# Patient Record
Sex: MALE | Race: WHITE | NOT HISPANIC OR LATINO | URBAN - METROPOLITAN AREA
[De-identification: names, ages, dates, MRNs, and addresses within clinical notes are randomized per-mention and may not be internally consistent; named-entity substitution may affect disease eponyms.]

---

## 2018-01-25 ENCOUNTER — EMERGENCY (EMERGENCY)
Facility: HOSPITAL | Age: 33
LOS: 1 days | Discharge: HOME | End: 2018-01-25
Attending: EMERGENCY MEDICINE | Admitting: EMERGENCY MEDICINE
Payer: MEDICAID

## 2018-01-25 VITALS
HEART RATE: 84 BPM | OXYGEN SATURATION: 100 % | SYSTOLIC BLOOD PRESSURE: 136 MMHG | RESPIRATION RATE: 20 BRPM | DIASTOLIC BLOOD PRESSURE: 80 MMHG | TEMPERATURE: 98 F

## 2018-01-25 LAB
ALBUMIN SERPL ELPH-MCNC: 4.5 G/DL — SIGNIFICANT CHANGE UP (ref 3.3–5)
ALP SERPL-CCNC: 59 U/L — SIGNIFICANT CHANGE UP (ref 40–120)
ALT FLD-CCNC: 65 U/L — HIGH (ref 4–41)
AST SERPL-CCNC: 40 U/L — SIGNIFICANT CHANGE UP (ref 4–40)
BASE EXCESS BLDV CALC-SCNC: 1.4 MMOL/L — SIGNIFICANT CHANGE UP
BASOPHILS # BLD AUTO: 0.04 K/UL — SIGNIFICANT CHANGE UP (ref 0–0.2)
BASOPHILS NFR BLD AUTO: 0.6 % — SIGNIFICANT CHANGE UP (ref 0–2)
BILIRUB SERPL-MCNC: 0.3 MG/DL — SIGNIFICANT CHANGE UP (ref 0.2–1.2)
BLOOD GAS VENOUS - CREATININE: 0.92 MG/DL — SIGNIFICANT CHANGE UP (ref 0.5–1.3)
BUN SERPL-MCNC: 17 MG/DL — SIGNIFICANT CHANGE UP (ref 7–23)
CALCIUM SERPL-MCNC: 9.3 MG/DL — SIGNIFICANT CHANGE UP (ref 8.4–10.5)
CHLORIDE BLDV-SCNC: 106 MMOL/L — SIGNIFICANT CHANGE UP (ref 96–108)
CHLORIDE SERPL-SCNC: 102 MMOL/L — SIGNIFICANT CHANGE UP (ref 98–107)
CO2 SERPL-SCNC: 25 MMOL/L — SIGNIFICANT CHANGE UP (ref 22–31)
CREAT SERPL-MCNC: 0.98 MG/DL — SIGNIFICANT CHANGE UP (ref 0.5–1.3)
EOSINOPHIL # BLD AUTO: 0.4 K/UL — SIGNIFICANT CHANGE UP (ref 0–0.5)
EOSINOPHIL NFR BLD AUTO: 6.3 % — HIGH (ref 0–6)
GAS PNL BLDV: 138 MMOL/L — SIGNIFICANT CHANGE UP (ref 136–146)
GLUCOSE BLDV-MCNC: 90 — SIGNIFICANT CHANGE UP (ref 70–99)
GLUCOSE SERPL-MCNC: 85 MG/DL — SIGNIFICANT CHANGE UP (ref 70–99)
HCO3 BLDV-SCNC: 24 MMOL/L — SIGNIFICANT CHANGE UP (ref 20–27)
HCT VFR BLD CALC: 40.5 % — SIGNIFICANT CHANGE UP (ref 39–50)
HCT VFR BLDV CALC: 43.5 % — SIGNIFICANT CHANGE UP (ref 39–51)
HGB BLD-MCNC: 14.2 G/DL — SIGNIFICANT CHANGE UP (ref 13–17)
HGB BLDV-MCNC: 14.2 G/DL — SIGNIFICANT CHANGE UP (ref 13–17)
IMM GRANULOCYTES # BLD AUTO: 0.01 # — SIGNIFICANT CHANGE UP
IMM GRANULOCYTES NFR BLD AUTO: 0.2 % — SIGNIFICANT CHANGE UP (ref 0–1.5)
LACTATE BLDV-MCNC: 1.2 MMOL/L — SIGNIFICANT CHANGE UP (ref 0.5–2)
LYMPHOCYTES # BLD AUTO: 2.32 K/UL — SIGNIFICANT CHANGE UP (ref 1–3.3)
LYMPHOCYTES # BLD AUTO: 36.5 % — SIGNIFICANT CHANGE UP (ref 13–44)
MCHC RBC-ENTMCNC: 30.1 PG — SIGNIFICANT CHANGE UP (ref 27–34)
MCHC RBC-ENTMCNC: 35.1 % — SIGNIFICANT CHANGE UP (ref 32–36)
MCV RBC AUTO: 85.8 FL — SIGNIFICANT CHANGE UP (ref 80–100)
MONOCYTES # BLD AUTO: 0.37 K/UL — SIGNIFICANT CHANGE UP (ref 0–0.9)
MONOCYTES NFR BLD AUTO: 5.8 % — SIGNIFICANT CHANGE UP (ref 2–14)
NEUTROPHILS # BLD AUTO: 3.22 K/UL — SIGNIFICANT CHANGE UP (ref 1.8–7.4)
NEUTROPHILS NFR BLD AUTO: 50.6 % — SIGNIFICANT CHANGE UP (ref 43–77)
NRBC # FLD: 0 — SIGNIFICANT CHANGE UP
PCO2 BLDV: 52 MMHG — HIGH (ref 41–51)
PH BLDV: 7.33 PH — SIGNIFICANT CHANGE UP (ref 7.32–7.43)
PLATELET # BLD AUTO: 254 K/UL — SIGNIFICANT CHANGE UP (ref 150–400)
PMV BLD: 9.9 FL — SIGNIFICANT CHANGE UP (ref 7–13)
PO2 BLDV: 26 MMHG — LOW (ref 35–40)
POTASSIUM BLDV-SCNC: 3.5 MMOL/L — SIGNIFICANT CHANGE UP (ref 3.4–4.5)
POTASSIUM SERPL-MCNC: 3.8 MMOL/L — SIGNIFICANT CHANGE UP (ref 3.5–5.3)
POTASSIUM SERPL-SCNC: 3.8 MMOL/L — SIGNIFICANT CHANGE UP (ref 3.5–5.3)
PROT SERPL-MCNC: 7.5 G/DL — SIGNIFICANT CHANGE UP (ref 6–8.3)
RBC # BLD: 4.72 M/UL — SIGNIFICANT CHANGE UP (ref 4.2–5.8)
RBC # FLD: 11.9 % — SIGNIFICANT CHANGE UP (ref 10.3–14.5)
SAO2 % BLDV: 39.7 % — LOW (ref 60–85)
SODIUM SERPL-SCNC: 141 MMOL/L — SIGNIFICANT CHANGE UP (ref 135–145)
WBC # BLD: 6.36 K/UL — SIGNIFICANT CHANGE UP (ref 3.8–10.5)
WBC # FLD AUTO: 6.36 K/UL — SIGNIFICANT CHANGE UP (ref 3.8–10.5)

## 2018-01-25 PROCEDURE — 99220: CPT

## 2018-01-25 RX ORDER — DIPHENHYDRAMINE HCL 50 MG
25 CAPSULE ORAL EVERY 4 HOURS
Qty: 0 | Refills: 0 | Status: DISCONTINUED | OUTPATIENT
Start: 2018-01-25 | End: 2018-01-25

## 2018-01-25 RX ORDER — DIPHENHYDRAMINE HCL 50 MG
25 CAPSULE ORAL ONCE
Qty: 0 | Refills: 0 | Status: COMPLETED | OUTPATIENT
Start: 2018-01-25 | End: 2018-01-25

## 2018-01-25 RX ORDER — ACETAMINOPHEN 500 MG
650 TABLET ORAL ONCE
Qty: 0 | Refills: 0 | Status: COMPLETED | OUTPATIENT
Start: 2018-01-25 | End: 2018-01-25

## 2018-01-25 RX ORDER — ACETAMINOPHEN 500 MG
650 TABLET ORAL EVERY 6 HOURS
Qty: 0 | Refills: 0 | Status: DISCONTINUED | OUTPATIENT
Start: 2018-01-25 | End: 2018-01-25

## 2018-01-25 RX ORDER — TETANUS TOXOID, REDUCED DIPHTHERIA TOXOID AND ACELLULAR PERTUSSIS VACCINE, ADSORBED 5; 2.5; 8; 8; 2.5 [IU]/.5ML; [IU]/.5ML; UG/.5ML; UG/.5ML; UG/.5ML
0.5 SUSPENSION INTRAMUSCULAR ONCE
Qty: 0 | Refills: 0 | Status: COMPLETED | OUTPATIENT
Start: 2018-01-25 | End: 2018-01-25

## 2018-01-25 RX ORDER — KETOROLAC TROMETHAMINE 30 MG/ML
30 SYRINGE (ML) INJECTION ONCE
Qty: 0 | Refills: 0 | Status: DISCONTINUED | OUTPATIENT
Start: 2018-01-25 | End: 2018-01-25

## 2018-01-25 RX ORDER — SODIUM CHLORIDE 9 MG/ML
1000 INJECTION INTRAMUSCULAR; INTRAVENOUS; SUBCUTANEOUS
Qty: 0 | Refills: 0 | Status: DISCONTINUED | OUTPATIENT
Start: 2018-01-25 | End: 2018-01-26

## 2018-01-25 RX ADMIN — Medication 25 MILLIGRAM(S): at 22:21

## 2018-01-25 RX ADMIN — Medication 30 MILLIGRAM(S): at 19:40

## 2018-01-25 RX ADMIN — SODIUM CHLORIDE 125 MILLILITER(S): 9 INJECTION INTRAMUSCULAR; INTRAVENOUS; SUBCUTANEOUS at 22:21

## 2018-01-25 RX ADMIN — Medication 30 MILLIGRAM(S): at 19:44

## 2018-01-25 RX ADMIN — Medication 650 MILLIGRAM(S): at 23:15

## 2018-01-25 RX ADMIN — TETANUS TOXOID, REDUCED DIPHTHERIA TOXOID AND ACELLULAR PERTUSSIS VACCINE, ADSORBED 0.5 MILLILITER(S): 5; 2.5; 8; 8; 2.5 SUSPENSION INTRAMUSCULAR at 19:43

## 2018-01-25 RX ADMIN — Medication 100 MILLIGRAM(S): at 19:39

## 2018-01-25 RX ADMIN — Medication 650 MILLIGRAM(S): at 22:21

## 2018-01-25 RX ADMIN — Medication 25 MILLIGRAM(S): at 19:39

## 2018-01-25 NOTE — ED PROVIDER NOTE - PROGRESS NOTE DETAILS
Area of cellulitis had line drawn around. Discussed wanting to continue monitoring on IV clinda to see improvement and watch in our OBS area which pt agreed to.

## 2018-01-25 NOTE — ED CDU PROVIDER INITIAL DAY NOTE - MEDICAL DECISION MAKING DETAILS
32 y/o male with no significant PMHx presents to ED for pruritic rash to right forearm X 1 wk.  Plan: abx, benadryl prn, am labs, IVF

## 2018-01-25 NOTE — ED PROVIDER NOTE - MEDICAL DECISION MAKING DETAILS
32 y/o male with right forearm rash and swelling with itching as well as to right flank area. Has been on Clinda X 2 doses. Concern for Cellulitis. Labs, IV antibiotics, Analgesia, Antihistamines.

## 2018-01-25 NOTE — ED PROVIDER NOTE - MUSCULOSKELETAL, MLM
Spine appears normal, range of motion is not limited, no muscle or joint tenderness. FROM at wrist and elbow.

## 2018-01-25 NOTE — ED CDU PROVIDER INITIAL DAY NOTE - ATTENDING CONTRIBUTION TO CARE
Pt was seen and evaluated by me in the ED. Pt was noted to have a rash to right forearm over the past week. Pt admits to redness to the area with itchiness. Pt denies any fever, chills, nausea, vomiting, SOB, chest pain, or abd pain. Denies any oral mucosal involvement. Pt states it started as a pimple and then popped with clear fluid. Pt notes since he has been to [facility] and given a cream and an antibiotic with worsening redness and spread. Pt returned yesterday to [facility] and was started on Clinda which he has had 2 doses over with continued redness and spread. Pt also notes having similar on right flank area. Noted to have an erythematous rash to right arm with areas to right flank. Not involving elbow with FROM of elbow and wrist. Blanching. Lungs CTA b/l. RRR. Abd soft, non-tender. No oral mucosal involvement. Sent to OBS to continue IV antibiotics and monitor.

## 2018-01-25 NOTE — ED CDU PROVIDER INITIAL DAY NOTE - OBJECTIVE STATEMENT
32 y/o male with no significant PMHx presents to ED for pruritic rash to right forearm X 1 wk. Pt states he started to have a pimple to the right forearm area that then popped and started to have increased redness. Pt was seen at an [facility] and started on a cream and antibiotic. Pt states the redness and swelling increased and was seen again at [facility] yesterday and started on Clinda. Pt had 2 doses but felt increased in redness and itchiness and came to ED. Pt denies any recent contact with plants. Admits to possible contact with dust particles prior to symptoms. Denies any fever, chills, SOB, chest pain, or abd pain.  In the ED: VS stable, labs WNL, placed in CDU for IV abx - clinda q6

## 2018-01-25 NOTE — ED ADULT TRIAGE NOTE - CHIEF COMPLAINT QUOTE
pt with right arm swelling and  redness . Pt also with same patch like in appearance  redness and swelling are to right lower back area and small lesions to left wrist area. Pt states first noticed it last monday was given cream and two different antibiotics in first med.  Pt states size of rash increased in size . pt co pain and throbbing sensation to site. pt with right arm swelling and  redness . Pt also with same patch like in appearance  redness and swelling are to right lower back area and small lesions to left wrist area. Pt states first noticed it last monday was given cream and two different antibiotics in first med.  Pt states size of rash increased in size . pt co pain and throbbing sensation to site. 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

## 2018-01-25 NOTE — ED PROVIDER NOTE - SKIN COLOR
erythematous, blanching to right forearm with swelling., Noted to have similar to right flank area as well.

## 2018-01-25 NOTE — ED PROVIDER NOTE - OBJECTIVE STATEMENT
34 y/o male with no significant PMHx presents to ED for rash to right forearm X 1 wk. Pt states he started to have a pimple to the right forearm area that then popped and started to have increased redness. Pt was seen at an [facility] and started on a cream and antibiotic. Pt states the redness and swelling increased and was seen again at [facility] yesterday and started on Clinda. Pt had 2 doses but felt increased in redness and itchiness and came to ED. Pt denies any recent contact with plants. Admits to possible contact with dust particles prior to symptoms. Denies any fever, chills, SOB, chest pain, or abd pain.

## 2018-01-25 NOTE — ED ADULT NURSE NOTE - CHIEF COMPLAINT QUOTE
pt with right arm swelling and  redness . Pt also with same patch like in appearance  redness and swelling are to right lower back area and small lesions to left wrist area. Pt states first noticed it last monday was given cream and two different antibiotics in first med.  Pt states size of rash increased in size . pt co pain and throbbing sensation to site. 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

## 2018-01-26 VITALS
DIASTOLIC BLOOD PRESSURE: 62 MMHG | TEMPERATURE: 98 F | SYSTOLIC BLOOD PRESSURE: 111 MMHG | OXYGEN SATURATION: 99 % | RESPIRATION RATE: 17 BRPM | HEART RATE: 71 BPM

## 2018-01-26 LAB
HBA1C BLD-MCNC: 5.3 % — SIGNIFICANT CHANGE UP (ref 4–5.6)
SPECIMEN SOURCE: SIGNIFICANT CHANGE UP
SPECIMEN SOURCE: SIGNIFICANT CHANGE UP

## 2018-01-26 PROCEDURE — 99217: CPT

## 2018-01-26 RX ORDER — DIPHENHYDRAMINE HCL 50 MG
25 CAPSULE ORAL ONCE
Qty: 0 | Refills: 0 | Status: DISCONTINUED | OUTPATIENT
Start: 2018-01-26 | End: 2018-01-26

## 2018-01-26 RX ORDER — DIPHENHYDRAMINE HCL 50 MG
1 CAPSULE ORAL
Qty: 15 | Refills: 0 | OUTPATIENT
Start: 2018-01-26 | End: 2018-01-30

## 2018-01-26 RX ORDER — DIPHENHYDRAMINE HCL 50 MG
25 CAPSULE ORAL ONCE
Qty: 0 | Refills: 0 | Status: COMPLETED | OUTPATIENT
Start: 2018-01-26 | End: 2018-01-26

## 2018-01-26 RX ORDER — IBUPROFEN 200 MG
1 TABLET ORAL
Qty: 20 | Refills: 0 | OUTPATIENT
Start: 2018-01-26 | End: 2018-01-30

## 2018-01-26 RX ORDER — KETOROLAC TROMETHAMINE 30 MG/ML
30 SYRINGE (ML) INJECTION ONCE
Qty: 0 | Refills: 0 | Status: DISCONTINUED | OUTPATIENT
Start: 2018-01-26 | End: 2018-01-26

## 2018-01-26 RX ADMIN — Medication 25 MILLIGRAM(S): at 12:18

## 2018-01-26 RX ADMIN — Medication 40 MILLIGRAM(S): at 01:13

## 2018-01-26 RX ADMIN — Medication 30 MILLIGRAM(S): at 04:32

## 2018-01-26 RX ADMIN — Medication 30 MILLIGRAM(S): at 05:20

## 2018-01-26 RX ADMIN — Medication 25 MILLIGRAM(S): at 04:35

## 2018-01-26 RX ADMIN — Medication 100 MILLIGRAM(S): at 12:00

## 2018-01-26 RX ADMIN — Medication 25 MILLIGRAM(S): at 05:22

## 2018-01-26 RX ADMIN — Medication 100 MILLIGRAM(S): at 04:22

## 2018-01-26 NOTE — ED CDU PROVIDER SUBSEQUENT DAY NOTE - PROGRESS NOTE DETAILS
SHAWANDA Peña: pt complaining of itching and pain. Will give toradol and benadryl. Pending am labs. Will continue to monitor and give IV abx q6

## 2018-01-26 NOTE — ED CDU PROVIDER SUBSEQUENT DAY NOTE - ATTENDING CONTRIBUTION TO CARE
34yo M no significant PMHx presents with pruritic erythematous rash to right forearm X 1 wk. Patient works installing heating/cooling equipment, recent abrasion of R forearm. Rash spreading while on oral abx, admitted to CDU for IV abx  Reports no fever and decreased area of redness  On exam awake & alert, mmm, lungs CTAB, RRR, 2+ pulses b/l, neuro A&Ox3, no focal deficits, skin warm, diffuse erythematous rash of R anterior forearm, decreased from area of marking, no fluctuance, mild serous drainage

## 2018-01-26 NOTE — ED CDU PROVIDER DISPOSITION NOTE - PLAN OF CARE
Follow up with your Primary Medical Doctor within 48-72hours. Rest and elevate affected area. Take Motrin 600mg every 8 hours with food for pain. Take Clindamycin as prescribed. Take Benadryl 25mg every 8 hours as needed for itching. Any worsening redness, swelling, streaking (red lines), fever, chills return to ER.

## 2018-01-30 LAB
BACTERIA BLD CULT: SIGNIFICANT CHANGE UP
BACTERIA BLD CULT: SIGNIFICANT CHANGE UP

## 2018-01-31 ENCOUNTER — EMERGENCY (EMERGENCY)
Facility: HOSPITAL | Age: 33
LOS: 1 days | Discharge: HOME | End: 2018-01-31
Attending: EMERGENCY MEDICINE | Admitting: EMERGENCY MEDICINE
Payer: MEDICAID

## 2018-01-31 VITALS
RESPIRATION RATE: 16 BRPM | HEART RATE: 90 BPM | OXYGEN SATURATION: 100 % | TEMPERATURE: 99 F | SYSTOLIC BLOOD PRESSURE: 122 MMHG | DIASTOLIC BLOOD PRESSURE: 81 MMHG

## 2018-01-31 VITALS
RESPIRATION RATE: 16 BRPM | DIASTOLIC BLOOD PRESSURE: 71 MMHG | OXYGEN SATURATION: 100 % | HEART RATE: 89 BPM | SYSTOLIC BLOOD PRESSURE: 134 MMHG

## 2018-01-31 LAB
ALBUMIN SERPL ELPH-MCNC: 4.3 G/DL — SIGNIFICANT CHANGE UP (ref 3.3–5)
ALP SERPL-CCNC: 54 U/L — SIGNIFICANT CHANGE UP (ref 40–120)
ALT FLD-CCNC: 56 U/L — HIGH (ref 4–41)
AST SERPL-CCNC: 26 U/L — SIGNIFICANT CHANGE UP (ref 4–40)
BASOPHILS # BLD AUTO: 0.02 K/UL — SIGNIFICANT CHANGE UP (ref 0–0.2)
BASOPHILS NFR BLD AUTO: 0.3 % — SIGNIFICANT CHANGE UP (ref 0–2)
BILIRUB SERPL-MCNC: 0.2 MG/DL — SIGNIFICANT CHANGE UP (ref 0.2–1.2)
BUN SERPL-MCNC: 14 MG/DL — SIGNIFICANT CHANGE UP (ref 7–23)
CALCIUM SERPL-MCNC: 9.2 MG/DL — SIGNIFICANT CHANGE UP (ref 8.4–10.5)
CHLORIDE SERPL-SCNC: 103 MMOL/L — SIGNIFICANT CHANGE UP (ref 98–107)
CO2 SERPL-SCNC: 24 MMOL/L — SIGNIFICANT CHANGE UP (ref 22–31)
CREAT SERPL-MCNC: 0.8 MG/DL — SIGNIFICANT CHANGE UP (ref 0.5–1.3)
EOSINOPHIL # BLD AUTO: 0.54 K/UL — HIGH (ref 0–0.5)
EOSINOPHIL NFR BLD AUTO: 6.8 % — HIGH (ref 0–6)
GLUCOSE SERPL-MCNC: 84 MG/DL — SIGNIFICANT CHANGE UP (ref 70–99)
HCT VFR BLD CALC: 41.6 % — SIGNIFICANT CHANGE UP (ref 39–50)
HGB BLD-MCNC: 14.4 G/DL — SIGNIFICANT CHANGE UP (ref 13–17)
IMM GRANULOCYTES # BLD AUTO: 0.01 # — SIGNIFICANT CHANGE UP
IMM GRANULOCYTES NFR BLD AUTO: 0.1 % — SIGNIFICANT CHANGE UP (ref 0–1.5)
LYMPHOCYTES # BLD AUTO: 2.8 K/UL — SIGNIFICANT CHANGE UP (ref 1–3.3)
LYMPHOCYTES # BLD AUTO: 35.4 % — SIGNIFICANT CHANGE UP (ref 13–44)
MCHC RBC-ENTMCNC: 29.4 PG — SIGNIFICANT CHANGE UP (ref 27–34)
MCHC RBC-ENTMCNC: 34.6 % — SIGNIFICANT CHANGE UP (ref 32–36)
MCV RBC AUTO: 84.9 FL — SIGNIFICANT CHANGE UP (ref 80–100)
MONOCYTES # BLD AUTO: 0.4 K/UL — SIGNIFICANT CHANGE UP (ref 0–0.9)
MONOCYTES NFR BLD AUTO: 5.1 % — SIGNIFICANT CHANGE UP (ref 2–14)
NEUTROPHILS # BLD AUTO: 4.14 K/UL — SIGNIFICANT CHANGE UP (ref 1.8–7.4)
NEUTROPHILS NFR BLD AUTO: 52.3 % — SIGNIFICANT CHANGE UP (ref 43–77)
NRBC # FLD: 0 — SIGNIFICANT CHANGE UP
PLATELET # BLD AUTO: 295 K/UL — SIGNIFICANT CHANGE UP (ref 150–400)
PMV BLD: 9.6 FL — SIGNIFICANT CHANGE UP (ref 7–13)
POTASSIUM SERPL-MCNC: 4.3 MMOL/L — SIGNIFICANT CHANGE UP (ref 3.5–5.3)
POTASSIUM SERPL-SCNC: 4.3 MMOL/L — SIGNIFICANT CHANGE UP (ref 3.5–5.3)
PROT SERPL-MCNC: 7.1 G/DL — SIGNIFICANT CHANGE UP (ref 6–8.3)
RBC # BLD: 4.9 M/UL — SIGNIFICANT CHANGE UP (ref 4.2–5.8)
RBC # FLD: 12.1 % — SIGNIFICANT CHANGE UP (ref 10.3–14.5)
SODIUM SERPL-SCNC: 141 MMOL/L — SIGNIFICANT CHANGE UP (ref 135–145)
WBC # BLD: 7.91 K/UL — SIGNIFICANT CHANGE UP (ref 3.8–10.5)
WBC # FLD AUTO: 7.91 K/UL — SIGNIFICANT CHANGE UP (ref 3.8–10.5)

## 2018-01-31 PROCEDURE — 73090 X-RAY EXAM OF FOREARM: CPT | Mod: 26,RT

## 2018-01-31 PROCEDURE — 99284 EMERGENCY DEPT VISIT MOD MDM: CPT

## 2018-01-31 PROCEDURE — 73130 X-RAY EXAM OF HAND: CPT | Mod: 26,RT

## 2018-01-31 RX ORDER — FAMOTIDINE 10 MG/ML
20 INJECTION INTRAVENOUS ONCE
Qty: 0 | Refills: 0 | Status: COMPLETED | OUTPATIENT
Start: 2018-01-31 | End: 2018-01-31

## 2018-01-31 RX ORDER — AZTREONAM 2 G
1 VIAL (EA) INJECTION
Qty: 14 | Refills: 0 | OUTPATIENT
Start: 2018-01-31 | End: 2018-02-06

## 2018-01-31 RX ORDER — EPINEPHRINE 0.3 MG/.3ML
0.3 INJECTION INTRAMUSCULAR; SUBCUTANEOUS ONCE
Qty: 0 | Refills: 0 | Status: COMPLETED | OUTPATIENT
Start: 2018-01-31 | End: 2018-01-31

## 2018-01-31 RX ORDER — DIPHENHYDRAMINE HCL 50 MG
25 CAPSULE ORAL ONCE
Qty: 0 | Refills: 0 | Status: COMPLETED | OUTPATIENT
Start: 2018-01-31 | End: 2018-01-31

## 2018-01-31 RX ADMIN — EPINEPHRINE 0.3 MILLIGRAM(S): 0.3 INJECTION INTRAMUSCULAR; SUBCUTANEOUS at 18:51

## 2018-01-31 RX ADMIN — Medication 25 MILLIGRAM(S): at 21:05

## 2018-01-31 RX ADMIN — FAMOTIDINE 20 MILLIGRAM(S): 10 INJECTION INTRAVENOUS at 21:05

## 2018-01-31 NOTE — ED PROVIDER NOTE - MEDICAL DECISION MAKING DETAILS
33 y.o male with worsening rash - ? allergic component given urticaria and dermatografia- will get basics, benadryl , trial epi, xray forearm and hand for swelling. IF improvement, pt will go home with keflex, benadryl and prednisone and needs an outpatient Derm follow up. Will reassess. 33 y.o male with worsening rash - ? allergic component given pruritis and dermatografia- will get basics, benadryl , trial epi, xray forearm and hand for swelling. IF improvement, pt will go home with keflex, benadryl and prednisone and needs an outpatient Derm follow up. Will reassess. 33 y.o male with worsening RUE rash and new pruritic areas to LUE - ? allergic component given pruritis and dermatografia- will get basics, benadryl , trial epi, xray forearm and hand for swelling. If improvement, pt will go home with abx, benadryl and prednisone and needs an outpatient Derm follow up. Will reassess.

## 2018-01-31 NOTE — ED PROVIDER NOTE - PHYSICAL EXAMINATION
RUE blanching rash extending from wrist to upper axilla  scattered areas of blisters RUE blanching rash extending from wrist to upper axilla  RUE and LUE with full range of motion  mild distal forearm and hand non pitting swelling  dermatografia when draw on RUE RUE blanching redness extending from wrist to upper axilla, appears allergic  RUE and LUE with full range of motion no tenderness to palpation   mild R distal forearm and hand non pitting swelling   dermatografia when drawn on RUE  medial LUE with scattered pinpoint raised pruritic lesions ( excoriations from pt itching)  no active draining of rash  no tenderness to RUE or LUE   pulses intact, compartments soft, moving all digits, cap refill normal.

## 2018-01-31 NOTE — ED ADULT TRIAGE NOTE - CHIEF COMPLAINT QUOTE
Pt states he was seen in the ED Thursday for a rash on his R arm and sent home with antibiotics. Pt complaining of the rash worsening and swelling to R arm. Pt denies any chest pain, SOB, fever, chills, N/V/D.

## 2018-01-31 NOTE — ED PROVIDER NOTE - ATTENDING CONTRIBUTION TO CARE
Seen and examined, pt. with recently tx arm cellulitis, reportedly improved with IV clindamycin in CDU but also was receiving IV benadryl for itching c/o. Returns with no fever, minimal pain but inc. redness and swelling to arm with new patchy red swollen areas to LUE, and back. +pruritus, +dermatografia, patchy reddened sl. raised areas. Pt. much improved after epi with smaller red patches and improved RUE. Will change abx but tx for generalized allergic rxn as well.

## 2018-01-31 NOTE — ED PROVIDER NOTE - PROGRESS NOTE DETAILS
mike paris: pt feeling much better after epi- improvement to rash. mike paris: pt feeling much better after epi- improvement of rash. will treat with benadryl, pepcid, prednisone and bactrim and outpatient derm f.u pt agress with plan. strict return precautions given, ready for discharge. mike paris: pt feeling much better after epi- subjective and objective improvement of rash. will treat with benadryl, pepcid, prednisone and bactrim and outpatient derm f.u pt agress with plan. strict return precautions given, ready for discharge.

## 2018-01-31 NOTE — ED PROVIDER NOTE - CARE PLAN
Principal Discharge DX:	Rash  Assessment and plan of treatment:	Rest, drink plenty of fluids.  Advance activity as tolerated. Stop taking clindamycin. Take benadryl and pepcid as directed. Take prednisone 40 mg once a day for 5 days. Take Bactrim twice a day for 7 days. Follow up with Dermatology- referral list provided. Follow up with your primary care physician in 48-72 hours- bring copies of your results.  Return to the Emergency Department for fevers, worsening or persistent symptoms OR ANY NEW OR CONCERNING SYMPTOMS.

## 2018-01-31 NOTE — ED PROVIDER NOTE - PLAN OF CARE
Rest, drink plenty of fluids.  Advance activity as tolerated. Stop taking clindamycin. Take benadryl and pepcid as directed. Take prednisone 40 mg once a day for 5 days. Take Bactrim twice a day for 7 days. Follow up with Dermatology- referral list provided. Follow up with your primary care physician in 48-72 hours- bring copies of your results.  Return to the Emergency Department for fevers, worsening or persistent symptoms OR ANY NEW OR CONCERNING SYMPTOMS.

## 2018-01-31 NOTE — ED PROVIDER NOTE - OBJECTIVE STATEMENT
33 y.o male no pmhx recently discharged from CDU on IV abx on 1/26 for RUE rash presents with worsening rash of RUE and new onset swelling to his RUE with rash to LUE. Pt states he has been taking clindamycin, benadryl without any relief. Pt works as an , no recent travel, no new exposures.  Denies any systemic symptoms. Denies fevers, chills, chest, pain, SOB, cough, n/v/d, abdominal pain, numbness, tingling, weakness. 33 y.o male no pmhx recently discharged from CDU on IV abx on 1/26 for RUE rash presents with worsening rash of RUE and new onset swelling to his RUE with rash to LUE. Pt states he has been taking clindamycin, benadryl without any relief. Pt works as an , no recent travel. Admits to itchiness of the rash. States he has had this breakout in the past when exposed to insulation fiber glass and believes possible exposure. Denies any systemic symptoms. Denies fevers, chills, chest, pain, SOB, cough, n/v/d, abdominal pain, numbness, tingling, weakness. Pt does admit that rash has improved as he use to have blisters last admission that have subsided. 33 y.o male no pmhx recently discharged from CDU on IV abx on 1/26 for RUE rash presents with worsening pruritic rash of RUE and new onset swelling to his RUE with rash to LUE. Pt states he has been taking clindamycin, benadryl without any relief. Pt works as an , no recent travel. States he has had this breakout in the past when exposed to insulation fiber glass and believes possible exposure. Denies any systemic symptoms. Denies fevers, chills, chest, pain, SOB, cough, n/v/d, abdominal pain, numbness, tingling, weakness. Pt does admit that rash has improved as he use to have blisters last admission that have subsided. 33 y.o male no pmhx recently admitted to CDU for IV abx on 1/26 for RUE rash presents with worsening pruritic rash of RUE and new onset swelling to his RUE with rash to LUE. Pt states he has been taking clindamycin, benadryl without any relief. Pt works as an , no recent travel. States he has had this breakout in the past when exposed to insulation fiber glass and believes possible exposure. Denies any systemic symptoms. Denies fevers, chills, chest, pain, SOB, cough, n/v/d, abdominal pain, numbness, tingling, weakness. Pt does admit that rash has improved as he use to have blisters last admission that have subsided.

## 2018-02-02 ENCOUNTER — APPOINTMENT (OUTPATIENT)
Dept: DERMATOLOGY | Facility: CLINIC | Age: 33
End: 2018-02-02
Payer: MEDICAID

## 2018-02-02 VITALS
SYSTOLIC BLOOD PRESSURE: 122 MMHG | HEIGHT: 72 IN | BODY MASS INDEX: 29.8 KG/M2 | WEIGHT: 220 LBS | DIASTOLIC BLOOD PRESSURE: 80 MMHG

## 2018-02-02 DIAGNOSIS — L25.9 UNSPECIFIED CONTACT DERMATITIS, UNSPECIFIED CAUSE: ICD-10-CM

## 2018-02-02 DIAGNOSIS — Z91.89 OTHER SPECIFIED PERSONAL RISK FACTORS, NOT ELSEWHERE CLASSIFIED: ICD-10-CM

## 2018-02-02 PROCEDURE — 99203 OFFICE O/P NEW LOW 30 MIN: CPT

## 2018-02-02 RX ORDER — DIPHENHYDRAMINE HYDROCHLORIDE 25 MG/1
25 CAPSULE ORAL
Qty: 15 | Refills: 0 | Status: ACTIVE | COMMUNITY
Start: 2018-01-26

## 2018-02-02 RX ORDER — PREDNISONE 20 MG/1
20 TABLET ORAL
Qty: 10 | Refills: 0 | Status: ACTIVE | COMMUNITY
Start: 2018-01-31

## 2018-02-02 RX ORDER — SULFAMETHOXAZOLE AND TRIMETHOPRIM 800; 160 MG/1; MG/1
800-160 TABLET ORAL
Qty: 14 | Refills: 0 | Status: ACTIVE | COMMUNITY
Start: 2018-01-31

## 2018-02-02 RX ORDER — BUPRENORPHINE AND NALOXONE 8; 2 MG/1; MG/1
8-2 TABLET SUBLINGUAL
Qty: 60 | Refills: 0 | Status: ACTIVE | COMMUNITY
Start: 2018-01-24

## 2018-02-02 RX ORDER — HALOBETASOL PROPIONATE 0.5 MG/G
0.05 OINTMENT TOPICAL TWICE DAILY
Qty: 2 | Refills: 1 | Status: ACTIVE | COMMUNITY
Start: 2018-02-02 | End: 1900-01-01

## 2018-02-02 RX ORDER — HYDROXYZINE HYDROCHLORIDE 25 MG/1
25 TABLET ORAL
Qty: 21 | Refills: 0 | Status: ACTIVE | COMMUNITY
Start: 2018-01-21

## 2018-02-02 RX ORDER — IBUPROFEN 600 MG/1
600 TABLET, FILM COATED ORAL
Qty: 20 | Refills: 0 | Status: ACTIVE | COMMUNITY
Start: 2018-01-26

## 2018-02-02 RX ORDER — CLINDAMYCIN HYDROCHLORIDE 300 MG/1
300 CAPSULE ORAL
Qty: 28 | Refills: 0 | Status: ACTIVE | COMMUNITY
Start: 2018-01-24

## 2018-02-02 RX ORDER — CEPHALEXIN 500 MG/1
500 CAPSULE ORAL
Qty: 30 | Refills: 0 | Status: ACTIVE | COMMUNITY
Start: 2018-01-20

## 2018-02-02 RX ORDER — MUPIROCIN 20 MG/G
2 OINTMENT TOPICAL
Qty: 22 | Refills: 0 | Status: ACTIVE | COMMUNITY
Start: 2018-01-20

## 2018-02-02 RX ORDER — CLINDAMYCIN HYDROCHLORIDE 150 MG/1
150 CAPSULE ORAL
Qty: 63 | Refills: 0 | Status: ACTIVE | COMMUNITY
Start: 2018-01-26

## 2018-11-08 NOTE — ED CDU PROVIDER INITIAL DAY NOTE - MUSCULOSKELETAL, MLM
no diabetes and no thyroid trouble.
Spine appears normal, range of motion is not limited, no muscle or joint tenderness